# Patient Record
Sex: MALE | Race: WHITE | Employment: FULL TIME | URBAN - METROPOLITAN AREA
[De-identification: names, ages, dates, MRNs, and addresses within clinical notes are randomized per-mention and may not be internally consistent; named-entity substitution may affect disease eponyms.]

---

## 2018-07-05 ENCOUNTER — HOSPITAL ENCOUNTER (EMERGENCY)
Age: 23
Discharge: HOME | End: 2018-07-05
Attending: EMERGENCY MEDICINE
Payer: COMMERCIAL

## 2018-07-05 VITALS
OXYGEN SATURATION: 97 % | TEMPERATURE: 98.1 F | SYSTOLIC BLOOD PRESSURE: 126 MMHG | HEART RATE: 77 BPM | RESPIRATION RATE: 17 BRPM | HEIGHT: 71 IN | BODY MASS INDEX: 23.8 KG/M2 | DIASTOLIC BLOOD PRESSURE: 70 MMHG | WEIGHT: 170 LBS

## 2018-07-05 DIAGNOSIS — H61.23 BILATERAL IMPACTED CERUMEN: ICD-10-CM

## 2018-07-05 DIAGNOSIS — H60.393 INFECTIVE OTITIS EXTERNA OF BOTH EARS: Primary | ICD-10-CM

## 2018-07-05 PROCEDURE — 99282 EMERGENCY DEPT VISIT SF MDM: CPT

## 2018-07-05 PROCEDURE — 6370000000 HC RX 637 (ALT 250 FOR IP): Performed by: EMERGENCY MEDICINE

## 2018-07-05 RX ORDER — NEOMYCIN SULFATE, POLYMYXIN B SULFATE AND HYDROCORTISONE 10; 3.5; 1 MG/ML; MG/ML; [USP'U]/ML
4 SUSPENSION/ DROPS AURICULAR (OTIC) ONCE
Status: COMPLETED | OUTPATIENT
Start: 2018-07-05 | End: 2018-07-05

## 2018-07-05 RX ADMIN — NEOMYCIN SULFATE, POLYMYXIN B SULFATE AND HYDROCORTISONE 4 DROP: 10; 3.5; 1 SUSPENSION/ DROPS AURICULAR (OTIC) at 22:38

## 2018-07-05 ASSESSMENT — ENCOUNTER SYMPTOMS
EYE PAIN: 0
COUGH: 0
DIARRHEA: 0
PHOTOPHOBIA: 0
RHINORRHEA: 0
ABDOMINAL DISTENTION: 0
SHORTNESS OF BREATH: 0
ABDOMINAL PAIN: 0
SINUS PRESSURE: 0
VOMITING: 0
APNEA: 0
SORE THROAT: 0
WHEEZING: 0
CONSTIPATION: 0
COLOR CHANGE: 0
NAUSEA: 0
BACK PAIN: 0

## 2018-07-05 ASSESSMENT — PAIN DESCRIPTION - LOCATION: LOCATION: EAR

## 2018-07-05 ASSESSMENT — PAIN SCALES - GENERAL: PAINLEVEL_OUTOF10: 1

## 2018-07-05 ASSESSMENT — PAIN DESCRIPTION - DESCRIPTORS: DESCRIPTORS: DISCOMFORT

## 2018-07-05 ASSESSMENT — PAIN DESCRIPTION - PAIN TYPE: TYPE: ACUTE PAIN

## 2018-07-06 NOTE — ED PROVIDER NOTES
available at the time of this note:    No orders to display         ED BEDSIDE ULTRASOUND:   Performed by ED Physician - none    LABS:  Labs Reviewed - No data to display    All other labs were within normal range or not returned as of this dictation. EMERGENCY DEPARTMENT COURSE and DIFFERENTIAL DIAGNOSIS/MDM:   Vitals:    Vitals:    07/05/18 2203   BP: 126/70   Pulse: 77   Resp: 17   Temp: 98.1 °F (36.7 °C)   TempSrc: Oral   SpO2: 97%   Weight: 170 lb (77.1 kg)   Height: 5' 11\" (1.803 m)           MDM  Number of Diagnoses or Management Options  Risk of Complications, Morbidity, and/or Mortality  Presenting problems: moderate  Diagnostic procedures: minimal  Management options: moderate    Patient Progress  Patient progress: improved      CRITICAL CARE TIME   Total Critical Care time was  minutes, excluding separately reportable procedures. There was a high probability of clinically significant/life threatening deterioration in the patient's condition which required my urgent intervention. CONSULTS:  None    PROCEDURES:  Unless otherwise noted below, none     Procedures    FINAL IMPRESSION      1. Infective otitis externa of both ears    2.  Bilateral impacted cerumen          DISPOSITION/PLAN   DISPOSITION Decision To Discharge 07/05/2018 10:31:29 PM      PATIENT REFERRED TO:  Everette Nelson MD  [address] [place] [address],[address] [place] [address] Road  [phone]    In 1 week        DISCHARGE MEDICATIONS:  Discharge Medication List as of 7/5/2018 10:32 PM             (Please note that portions of this note were completed with a voice recognition program.  Efforts were made to edit the dictations but occasionally words are mis-transcribed.)    Jennifer Plummer MD (electronically signed)  Attending Emergency Physician          Jennifer Plummer MD  07/06/18 6091 [facility] Barbara Peraza MD  07/12/18 1639

## 2020-06-17 ENCOUNTER — HOSPITAL ENCOUNTER (OUTPATIENT)
Age: 25
Setting detail: SPECIMEN
Discharge: HOME | End: 2020-06-17
Payer: COMMERCIAL

## 2020-06-17 ENCOUNTER — OFFICE VISIT (OUTPATIENT)
Dept: PRIMARY CARE CLINIC | Age: 25
End: 2020-06-17
Payer: COMMERCIAL

## 2020-06-17 VITALS
SYSTOLIC BLOOD PRESSURE: 125 MMHG | TEMPERATURE: 98.2 F | HEART RATE: 94 BPM | DIASTOLIC BLOOD PRESSURE: 71 MMHG | OXYGEN SATURATION: 96 % | RESPIRATION RATE: 16 BRPM

## 2020-06-17 PROBLEM — M25.561 PAIN IN RIGHT KNEE: Status: ACTIVE | Noted: 2018-06-06

## 2020-06-17 PROBLEM — M54.50 LOW BACK PAIN: Status: ACTIVE | Noted: 2019-02-24

## 2020-06-17 PROBLEM — M23.261 OLD BUCKET HANDLE TEAR OF LATERAL MENISCUS OF RIGHT KNEE: Status: ACTIVE | Noted: 2018-08-08

## 2020-06-17 PROCEDURE — U0003 INFECTIOUS AGENT DETECTION BY NUCLEIC ACID (DNA OR RNA); SEVERE ACUTE RESPIRATORY SYNDROME CORONAVIRUS 2 (SARS-COV-2) (CORONAVIRUS DISEASE [COVID-19]), AMPLIFIED PROBE TECHNIQUE, MAKING USE OF HIGH THROUGHPUT TECHNOLOGIES AS DESCRIBED BY CMS-2020-01-R: HCPCS

## 2020-06-17 PROCEDURE — 99202 OFFICE O/P NEW SF 15 MIN: CPT | Performed by: NURSE PRACTITIONER

## 2020-06-17 ASSESSMENT — ENCOUNTER SYMPTOMS
NAUSEA: 0
COUGH: 0
SHORTNESS OF BREATH: 0
DIARRHEA: 0
VOMITING: 0
WHEEZING: 0
ABDOMINAL PAIN: 0
CHEST TIGHTNESS: 0

## 2020-06-17 NOTE — PROGRESS NOTES
[address]  [address] [address]  Dept: [phone]    SUBJECTIVE:   Raghav Melgar is a 30 y.o. male who presents on 6/17/2020 with:    HPI:  Chief Complaint   Patient presents with    Covid Testing     COVID-19 Testing:   Azul Ruiz was self-referred to [facility] for COVID-19 testing required by employer before he can return to work. States he recently finished14-day quarantine due to his girlfriend and 2 y.o. son (same household as pt) were confirmed positive for COVID-19 on 6/2/20. Pt reports he has been in his usual state lately. Denies any signs/ symptoms of illness now or during 2 week quarantine. Evaluation/Treatment to date: none    Pt denies: fever, chills, myalgias, fatigue, cough, dyspnea, wheezing, chest tightness, HA, rhinorrhea/ congestion, sore throat, ear symptoms, skin changes/ rash. Denies anosmia/ dysgeusia. Relevant PMH: no pertinent PMH. Denies contributory past family hx. Smoking history: pt is a current every day smoker, about 1.00 pack per day. he has never used smokeless tobacco.     Travel screen completed: yes- no recent travel. Review of Systems   Constitutional: Negative for chills and fever. HENT: Negative for congestion, ear pain, rhinorrhea and sore throat. Respiratory: Negative for cough, chest tightness, shortness of breath and wheezing. Cardiovascular: Negative for chest pain and palpitations. Gastrointestinal: Negative for abdominal pain, diarrhea, nausea and vomiting. Musculoskeletal: Negative for myalgias. Skin: Negative for rash. Allergic/Immunologic: Negative for immunocompromised state. Neurological: Negative for headaches. OBJECTIVE:     VITALS:  Vitals:    06/17/20 1325   BP: 125/71   Pulse: 94   Resp: 16   Temp: 98.2 °F (36.8 °C)   TempSrc: Oral   SpO2: 96%  Comment: on room air      Physical Exam  Vitals signs reviewed. Constitutional:       Appearance: He is well-groomed.  He is not specimen was collected via oropharyngeal route and sent to lab. ASSESSMENT & PLAN:   Otherwise healthy, asymptomatic 30 y.o. male w/ exposure to COVID-19 via 2 household members presenting for COVID-19 test in order to return to work. Pt is well-appearing, afebrile, asymptomatic today in office. Oropharyngeal swab for SARS-CoV-2 testing was obtained. Pt was instructed on strict self-isolation at home pending results w/ self-monitoring for development of symptoms. Diagnoses and all orders for this visit:     Diagnosis Orders   1. Close exposure to COVID-19 virus  COVID-19 Ambulatory     Pt was sent home w/ written instructions for:  Possible COVID-19 infection w/ self-quarantine at home and self-monitoring for symptoms  Do not leave home except to get medical care  Frequent hand washing, cover coughs/ sneezes, clean + disinfect frequently touched surfaces daily  OTC NSAIDs/ acetaminophen prn fever/ myalgias  Maintain adequate rest and hydration  Discussed and encouraged smoking cessation  Discussed temp. checks BID and vigilant self-monitoring for any signs/ sx's of illness w/ PCP or Flu Clinic follow-up (call first) if any develop    ED/ 9-1-1 immediately w/ high or refractory fever, SOB, CP, shaking chills, vomiting, abdominal pain, etc.    Pt is in agreement w/ this care plan. All questions answered. Return for follow-up with [facility] to establish with a PCP. ANDREW Nova - CNP  6/17/20     This visit was provided as a focused evaluation during the COVID -19 pandemic/national emergency. A comprehensive review of all previous patient history and testing was not conducted. Pertinent findings were elicited during the visit.

## 2020-06-17 NOTE — PATIENT INSTRUCTIONS
Put on a facemask before you enter the facility. These steps will help the healthcare providers office to keep other people in the office or waiting room from getting infected or exposed. Ask your healthcare provider to call the local or state health department. Persons who are placed under active monitoring or facilitated self-monitoring should follow instructions provided by their local health department or occupational health professionals, as appropriate. When working with your local health department check their available hours. If you have a medical emergency and need to call 911, notify the dispatch personnel that you have, or are being evaluated for COVID-19. If possible, put on a facemask before emergency medical services arrive. Discontinuing home isolation  Patients with confirmed COVID-19 should remain under home isolation precautions until the risk of secondary transmission to others is thought to be low. The decision to discontinue home isolation precautions should be made on a case-by-case basis, in consultation with healthcare providers and state and local health departments.

## 2020-06-20 LAB
SARS-COV-2: NOT DETECTED
SOURCE: NORMAL

## 2020-06-22 ASSESSMENT — ENCOUNTER SYMPTOMS
SORE THROAT: 0
RHINORRHEA: 0

## 2021-06-10 ENCOUNTER — OFFICE VISIT (OUTPATIENT)
Dept: PRIMARY CARE CLINIC | Age: 26
End: 2021-06-10
Payer: COMMERCIAL

## 2021-06-10 VITALS
BODY MASS INDEX: 24.92 KG/M2 | TEMPERATURE: 98.1 F | WEIGHT: 178 LBS | SYSTOLIC BLOOD PRESSURE: 112 MMHG | HEIGHT: 71 IN | DIASTOLIC BLOOD PRESSURE: 70 MMHG

## 2021-06-10 DIAGNOSIS — R07.81 PLEURITIC CHEST PAIN: ICD-10-CM

## 2021-06-10 DIAGNOSIS — Z76.89 ENCOUNTER TO ESTABLISH CARE: ICD-10-CM

## 2021-06-10 DIAGNOSIS — Z00.00 PREVENTATIVE HEALTH CARE: ICD-10-CM

## 2021-06-10 DIAGNOSIS — R07.89 MUSCULOSKELETAL CHEST PAIN: Primary | ICD-10-CM

## 2021-06-10 LAB
ALBUMIN SERPL-MCNC: 4.6 G/DL (ref 3.5–4.6)
ALP BLD-CCNC: 71 U/L (ref 35–104)
ALT SERPL-CCNC: 16 U/L (ref 0–41)
ANION GAP SERPL CALCULATED.3IONS-SCNC: 13 MEQ/L (ref 9–15)
AST SERPL-CCNC: 23 U/L (ref 0–40)
BASOPHILS ABSOLUTE: 0 K/UL (ref 0–0.2)
BASOPHILS RELATIVE PERCENT: 0.6 %
BILIRUB SERPL-MCNC: 0.6 MG/DL (ref 0.2–0.7)
BUN BLDV-MCNC: 17 MG/DL (ref 6–20)
CALCIUM SERPL-MCNC: 9.8 MG/DL (ref 8.5–9.9)
CHLORIDE BLD-SCNC: 105 MEQ/L (ref 95–107)
CO2: 23 MEQ/L (ref 20–31)
CREAT SERPL-MCNC: 0.67 MG/DL (ref 0.7–1.2)
D DIMER: 0.3 MG/L FEU (ref 0–0.5)
EOSINOPHILS ABSOLUTE: 0.1 K/UL (ref 0–0.7)
EOSINOPHILS RELATIVE PERCENT: 1.5 %
GFR AFRICAN AMERICAN: >60
GFR NON-AFRICAN AMERICAN: >60
GLOBULIN: 2.8 G/DL (ref 2.3–3.5)
GLUCOSE BLD-MCNC: 86 MG/DL (ref 70–99)
HCT VFR BLD CALC: 43.2 % (ref 42–52)
HEMOGLOBIN: 14.7 G/DL (ref 14–18)
LYMPHOCYTES ABSOLUTE: 1.9 K/UL (ref 1–4.8)
LYMPHOCYTES RELATIVE PERCENT: 33 %
MCH RBC QN AUTO: 31.3 PG (ref 27–31.3)
MCHC RBC AUTO-ENTMCNC: 34 % (ref 33–37)
MCV RBC AUTO: 92 FL (ref 80–100)
MONOCYTES ABSOLUTE: 0.3 K/UL (ref 0.2–0.8)
MONOCYTES RELATIVE PERCENT: 4.9 %
NEUTROPHILS ABSOLUTE: 3.5 K/UL (ref 1.4–6.5)
NEUTROPHILS RELATIVE PERCENT: 60 %
PDW BLD-RTO: 13.3 % (ref 11.5–14.5)
PLATELET # BLD: 193 K/UL (ref 130–400)
POTASSIUM SERPL-SCNC: 4.4 MEQ/L (ref 3.4–4.9)
RBC # BLD: 4.69 M/UL (ref 4.7–6.1)
SODIUM BLD-SCNC: 141 MEQ/L (ref 135–144)
TOTAL PROTEIN: 7.4 G/DL (ref 6.3–8)
TSH REFLEX: 0.42 UIU/ML (ref 0.44–3.86)
WBC # BLD: 5.9 K/UL (ref 4.8–10.8)

## 2021-06-10 PROCEDURE — 99204 OFFICE O/P NEW MOD 45 MIN: CPT | Performed by: INTERNAL MEDICINE

## 2021-06-10 RX ORDER — IBUPROFEN 600 MG/1
600 TABLET ORAL 4 TIMES DAILY PRN
Qty: 60 TABLET | Refills: 1 | Status: SHIPPED | OUTPATIENT
Start: 2021-06-10 | End: 2021-06-10

## 2021-06-10 RX ORDER — KETOROLAC TROMETHAMINE 10 MG/1
10 TABLET, FILM COATED ORAL EVERY 6 HOURS PRN
Qty: 20 TABLET | Refills: 0 | Status: SHIPPED | OUTPATIENT
Start: 2021-06-10 | End: 2022-06-10

## 2021-06-10 SDOH — ECONOMIC STABILITY: TRANSPORTATION INSECURITY
IN THE PAST 12 MONTHS, HAS LACK OF TRANSPORTATION KEPT YOU FROM MEETINGS, WORK, OR FROM GETTING THINGS NEEDED FOR DAILY LIVING?: NO

## 2021-06-10 SDOH — ECONOMIC STABILITY: FOOD INSECURITY: WITHIN THE PAST 12 MONTHS, THE FOOD YOU BOUGHT JUST DIDN'T LAST AND YOU DIDN'T HAVE MONEY TO GET MORE.: NEVER TRUE

## 2021-06-10 SDOH — ECONOMIC STABILITY: TRANSPORTATION INSECURITY
IN THE PAST 12 MONTHS, HAS THE LACK OF TRANSPORTATION KEPT YOU FROM MEDICAL APPOINTMENTS OR FROM GETTING MEDICATIONS?: NO

## 2021-06-10 SDOH — ECONOMIC STABILITY: FOOD INSECURITY: WITHIN THE PAST 12 MONTHS, YOU WORRIED THAT YOUR FOOD WOULD RUN OUT BEFORE YOU GOT MONEY TO BUY MORE.: NEVER TRUE

## 2021-06-10 ASSESSMENT — PATIENT HEALTH QUESTIONNAIRE - PHQ9
1. LITTLE INTEREST OR PLEASURE IN DOING THINGS: 0
SUM OF ALL RESPONSES TO PHQ QUESTIONS 1-9: 0
SUM OF ALL RESPONSES TO PHQ9 QUESTIONS 1 & 2: 0
2. FEELING DOWN, DEPRESSED OR HOPELESS: 0
SUM OF ALL RESPONSES TO PHQ QUESTIONS 1-9: 0
SUM OF ALL RESPONSES TO PHQ QUESTIONS 1-9: 0

## 2021-06-10 ASSESSMENT — ENCOUNTER SYMPTOMS
DIARRHEA: 0
SHORTNESS OF BREATH: 1
NAUSEA: 0
ABDOMINAL PAIN: 0
WHEEZING: 0
COUGH: 0
VOMITING: 0

## 2021-06-10 ASSESSMENT — SOCIAL DETERMINANTS OF HEALTH (SDOH): HOW HARD IS IT FOR YOU TO PAY FOR THE VERY BASICS LIKE FOOD, HOUSING, MEDICAL CARE, AND HEATING?: NOT HARD AT ALL

## 2021-06-10 NOTE — PROGRESS NOTES
Subjective:      Patient ID: Chivo Botello is a 30 y.o. male    New patient   HPI  Patient presents to establish care with new PCP. PMHx: none   Family hx colon cancer: none   Last tetanus shot: declines today             CC: centralized chest pain x 6 months   Pain is sharp and dull pain rated 5-7/10, nonradiating, worse with physical exertion and arm raising. CP is related to breathing and coughing, though no cough. No help from OTC meds. Assoc SOB, no wheezing or palpitations, no fever or chills. No antecedent trauma. Hx b/l clavicular fracture at least 10 yrs ago. Claims he was seen in [address] [place] [address] last month but no records found in Care Everywhere. History reviewed. No pertinent past medical history.   Past Surgical History:   Procedure Laterality Date    ANKLE SURGERY      left    KNEE CARTILAGE SURGERY Right 2018     Social History     Socioeconomic History    Marital status: Single     Spouse name: Not on file    Number of children: Not on file    Years of education: Not on file    Highest education level: Not on file   Occupational History    Not on file   Tobacco Use    Smoking status: Former Smoker     Packs/day: 1.00     Years: 10.00     Pack years: 10.00     Types: Cigarettes     Quit date: 6/10/2020     Years since quittin.0    Smokeless tobacco: Never Used   Vaping Use    Vaping Use: Some days    Substances: Nicotine   Substance and Sexual Activity    Alcohol use: No    Drug use: Yes     Types: Marijuana    Sexual activity: Yes     Partners: Female   Other Topics Concern    Not on file   Social History Narrative    Not on file     Social Determinants of Health     Financial Resource Strain: Low Risk     Difficulty of Paying Living Expenses: Not hard at all   Food Insecurity: No Food Insecurity    Worried About Running Out of Food in the Last Year: Never true    Young of Food in the Last Year: Never true   Transportation Needs: No Transportation Needs    Lack of not diaphoretic. Cardiovascular:      Rate and Rhythm: Normal rate and regular rhythm. Pulses: Normal pulses. Heart sounds: Normal heart sounds, S1 normal and S2 normal. No murmur heard. Pulmonary:      Effort: Pulmonary effort is normal. No respiratory distress. Breath sounds: Normal breath sounds. No wheezing or rales. Chest:      Chest wall: Tenderness (marked chest wall TTP, no rash ) present. Abdominal:      General: Bowel sounds are normal.      Tenderness: There is no abdominal tenderness. Neurological:      Mental Status: He is alert. Assessment:       Diagnosis Orders   1. Musculoskeletal chest pain  XR CHEST STANDARD (2 VW)    [facility]    DISCONTINUED: ibuprofen (ADVIL;MOTRIN) 600 MG tablet   2. Pleuritic chest pain  D-Dimer, Quantitative    less likely PE   3. Preventative health care  CBC With Auto Differential    Comprehensive Metabolic Panel    TSH with Reflex    Hepatitis C Antibody    HIV Screen   4.  Encounter to establish care       Plan:      Orders Placed This Encounter   Procedures    XR CHEST STANDARD (2 VW)     Standing Status:   Future     Standing Expiration Date:   6/10/2022     Order Specific Question:   Reason for exam:     Answer:   ?? missing rib (per pt)    CBC With Auto Differential     Standing Status:   Future     Number of Occurrences:   1     Standing Expiration Date:   6/10/2022    Comprehensive Metabolic Panel     Standing Status:   Future     Number of Occurrences:   1     Standing Expiration Date:   6/10/2022    TSH with Reflex     Standing Status:   Future     Number of Occurrences:   1     Standing Expiration Date:   6/10/2022    Hepatitis C Antibody     Standing Status:   Future     Number of Occurrences:   1     Standing Expiration Date:   6/10/2022    HIV Screen     Standing Status:   Future     Number of Occurrences:   1     Standing Expiration Date:   6/10/2022    D-Dimer, Quantitative Standing Status:   Future     Number of Occurrences:   1     Standing Expiration Date:   6/10/2022   [address] and [facility], [place]     Referral Priority:   Routine     Referral Type:   Eval and Treat     Referral Reason:   Specialty Services Required     Requested Specialty:   Orthopedic Surgery     Number of Visits Requested:   1     Orders Placed This Encounter   Medications    DISCONTD: ibuprofen (ADVIL;MOTRIN) 600 MG tablet     Sig: Take 1 tablet by mouth 4 times daily as needed for Pain     Dispense:  60 tablet     Refill:  1    ketorolac (TORADOL) 10 MG tablet     Sig: Take 1 tablet by mouth every 6 hours as needed for Pain     Dispense:  20 tablet     Refill:  0     Dc ibuprofen 600     Return in about 1 month (around 7/10/2021) for follow up.

## 2021-06-11 DIAGNOSIS — R79.89 LOW TSH LEVEL: Primary | ICD-10-CM

## 2021-06-11 LAB — HEPATITIS C ANTIBODY INTERPRETATION: NORMAL

## 2021-06-12 LAB — HIV AG/AB: NONREACTIVE

## 2021-06-14 ENCOUNTER — TELEPHONE (OUTPATIENT)
Dept: PRIMARY CARE CLINIC | Age: 26
End: 2021-06-14

## 2021-06-14 NOTE — TELEPHONE ENCOUNTER
The ketorolac you gave the patient has not been helping, he is still experiencing the pain. Please advise.

## 2021-06-15 ENCOUNTER — TELEPHONE (OUTPATIENT)
Dept: PRIMARY CARE CLINIC | Age: 26
End: 2021-06-15

## 2021-06-15 DIAGNOSIS — R07.89 MUSCULOSKELETAL CHEST PAIN: Primary | ICD-10-CM

## 2021-06-15 DIAGNOSIS — R79.89 LOW TSH LEVEL: ICD-10-CM

## 2021-06-15 LAB
T3 FREE: 3.1 PG/ML (ref 2–4.4)
T4 FREE: 0.98 NG/DL (ref 0.84–1.68)
TSH REFLEX: 1.2 UIU/ML (ref 0.44–3.86)

## 2021-06-15 RX ORDER — IBUPROFEN 800 MG/1
800 TABLET ORAL 2 TIMES DAILY PRN
Qty: 60 TABLET | Refills: 0 | Status: SHIPPED | OUTPATIENT
Start: 2021-06-15

## 2021-06-16 NOTE — TELEPHONE ENCOUNTER
Pt came in late on 6/15 and had labs and xray done, he is advised of taking the ibuprofen on a full stomach. He just asked if he could be notified sooner rather than later when all the labs and xray come back.

## 2021-06-19 DIAGNOSIS — R07.89 MUSCULOSKELETAL CHEST PAIN: Primary | ICD-10-CM

## 2021-06-19 RX ORDER — LIDOCAINE 4 G/G
1 PATCH TOPICAL DAILY
Qty: 1 BOX | Refills: 3 | Status: SHIPPED | OUTPATIENT
Start: 2021-06-19

## 2021-06-21 DIAGNOSIS — R07.89 MUSCULOSKELETAL CHEST PAIN: Primary | ICD-10-CM

## 2021-06-23 ENCOUNTER — TELEPHONE (OUTPATIENT)
Dept: PRIMARY CARE CLINIC | Age: 26
End: 2021-06-23

## 2021-06-23 DIAGNOSIS — R07.89 MUSCULOSKELETAL CHEST PAIN: Primary | ICD-10-CM

## 2021-06-23 RX ORDER — HYDROCODONE BITARTRATE AND ACETAMINOPHEN 5; 325 MG/1; MG/1
1 TABLET ORAL EVERY 8 HOURS PRN
Qty: 10 TABLET | Refills: 0 | Status: SHIPPED | OUTPATIENT
Start: 2021-06-23 | End: 2021-06-30

## 2021-06-23 NOTE — TELEPHONE ENCOUNTER
Patient calling in, states that you sent over scripts for him and not helping  Still having pain from center of chest up into his left shoulder  Asking if you can call him back

## 2021-06-23 NOTE — TELEPHONE ENCOUNTER
Aryan Even call Dr. Mulu Ott office to hep pt set appt   Also help him make appt for CT chest     Thank you

## 2021-06-23 NOTE — TELEPHONE ENCOUNTER
I called and spoke with Kourtney Valera MD, Cardiothoracic Surgery Office and they told me they will reach out to the patient today and get his Ct and all appointment needed scheduled today.

## 2021-07-02 ENCOUNTER — HOSPITAL ENCOUNTER (OUTPATIENT)
Dept: CT IMAGING | Age: 26
Discharge: HOME | End: 2021-07-04
Payer: COMMERCIAL

## 2021-07-02 ENCOUNTER — TELEPHONE (OUTPATIENT)
Dept: PRIMARY CARE CLINIC | Age: 26
End: 2021-07-02

## 2021-07-02 DIAGNOSIS — R07.89 MUSCULOSKELETAL CHEST PAIN: ICD-10-CM

## 2021-07-02 DIAGNOSIS — R07.89 MUSCULOSKELETAL CHEST PAIN: Primary | ICD-10-CM

## 2021-07-02 PROCEDURE — 71250 CT THORAX DX C-: CPT

## 2021-07-02 RX ORDER — CYCLOBENZAPRINE HCL 10 MG
10 TABLET ORAL NIGHTLY PRN
Qty: 30 TABLET | Refills: 0 | Status: SHIPPED | OUTPATIENT
Start: 2021-07-02 | End: 2021-07-12

## 2021-07-02 NOTE — TELEPHONE ENCOUNTER
He had his CT of the chest done this morning and he is still having chest pain going in to his shoulder. His # is [phone].

## 2021-07-03 ENCOUNTER — TELEPHONE (OUTPATIENT)
Dept: FAMILY MEDICINE CLINIC | Age: 26
End: 2021-07-03

## 2021-07-03 RX ORDER — PREDNISONE 10 MG/1
TABLET ORAL
Qty: 21 TABLET | Refills: 0 | Status: SHIPPED | OUTPATIENT
Start: 2021-07-03

## 2021-07-03 RX ORDER — TIZANIDINE 2 MG/1
2 TABLET ORAL EVERY 8 HOURS PRN
Qty: 30 TABLET | Refills: 0 | Status: SHIPPED | OUTPATIENT
Start: 2021-07-03

## 2021-07-03 NOTE — TELEPHONE ENCOUNTER
Spoke with patient  Agreeable to prednisone and muscle relaxer  Watch for sedation  Please go to walk in clinic or urgent care if not better asap  He mentions voltaren and flexeril not helping  Try diff muscle relaxer and try prednisone  He failed lidocaine patch as well.

## 2021-07-06 ENCOUNTER — TELEPHONE (OUTPATIENT)
Dept: PRIMARY CARE CLINIC | Age: 26
End: 2021-07-06

## 2021-07-06 DIAGNOSIS — R07.89 MUSCULOSKELETAL CHEST PAIN: Primary | ICD-10-CM

## 2021-07-06 NOTE — TELEPHONE ENCOUNTER
Pt aware and given the number to the pain management dept. Seemed upset said the provider on call said if what he didn't didn't help he could just call back to provider and they would change. I did tell him it was up to the provider, and she wants him to seek treatment with pain management.

## 2021-07-06 NOTE — TELEPHONE ENCOUNTER
Pt was switched to Zanalfex and prednisone by Dr. Saniya Jaimes, but says that is still not helping symptoms (shoulder pain and chest tightness) and it is making him very drowsy and and nauseous.

## 2021-07-07 NOTE — TELEPHONE ENCOUNTER
Barry Lorenz, per yesterday's discussion pls get pain mx to see him today. I understand you could not get them on the phone yesterday.      Thanks

## 2021-07-09 ENCOUNTER — OFFICE VISIT (OUTPATIENT)
Dept: CARDIOTHORACIC SURGERY | Age: 26
End: 2021-07-09

## 2021-07-09 VITALS — HEART RATE: 73 BPM | WEIGHT: 178 LBS | OXYGEN SATURATION: 97 % | BODY MASS INDEX: 24.92 KG/M2 | HEIGHT: 71 IN

## 2021-07-09 DIAGNOSIS — I71.21 ASCENDING AORTIC ANEURYSM: Primary | ICD-10-CM

## 2021-07-09 DIAGNOSIS — R07.89 STERNUM PAIN: Primary | ICD-10-CM

## 2021-07-09 PROCEDURE — 99203 OFFICE O/P NEW LOW 30 MIN: CPT | Performed by: THORACIC SURGERY (CARDIOTHORACIC VASCULAR SURGERY)

## 2021-07-09 ASSESSMENT — ENCOUNTER SYMPTOMS
CHEST TIGHTNESS: 0
TROUBLE SWALLOWING: 0
STRIDOR: 0
WHEEZING: 0
VOMITING: 0
NAUSEA: 0
COUGH: 0
SHORTNESS OF BREATH: 0
ABDOMINAL DISTENTION: 0
ABDOMINAL PAIN: 0
VOICE CHANGE: 0
CHOKING: 0
DIARRHEA: 0
SORE THROAT: 0

## 2021-07-09 NOTE — PATIENT INSTRUCTIONS
Recommend continuing to take anti-inflammatories and avoid activities that make his costal cartilages irritated.

## 2021-07-19 ENCOUNTER — INITIAL CONSULT (OUTPATIENT)
Dept: PAIN MANAGEMENT | Age: 26
End: 2021-07-19
Payer: COMMERCIAL

## 2021-07-19 VITALS
HEIGHT: 71 IN | SYSTOLIC BLOOD PRESSURE: 130 MMHG | TEMPERATURE: 97.9 F | WEIGHT: 170 LBS | DIASTOLIC BLOOD PRESSURE: 79 MMHG | BODY MASS INDEX: 23.8 KG/M2

## 2021-07-19 DIAGNOSIS — M25.512 LEFT SHOULDER PAIN, UNSPECIFIED CHRONICITY: ICD-10-CM

## 2021-07-19 DIAGNOSIS — R07.9 CHEST PAIN, UNSPECIFIED TYPE: Primary | ICD-10-CM

## 2021-07-19 PROCEDURE — 99204 OFFICE O/P NEW MOD 45 MIN: CPT | Performed by: PAIN MEDICINE

## 2021-07-19 ASSESSMENT — ENCOUNTER SYMPTOMS
BACK PAIN: 0
SHORTNESS OF BREATH: 0
DIARRHEA: 0
CONSTIPATION: 0
NAUSEA: 0

## 2021-07-19 NOTE — PROGRESS NOTES
[facility]) Physicians  Neurosurgery and Pain [address], [address] [facility], [place]: [phone]  F: [phone]        Thu Rubi  (1995)    7/19/2021    Subjective:     Thu Rubi is 30 y.o. male who complains today of:     Chief Complaint   Patient presents with    Other     chest    Shoulder Pain     left     Oksana Wolfe is here today for initial evaluation. He did see Dr. Glenny Hein from cardiothoracic surgery on 7/9/2020. Patient has chest pain that travels to the left shoulder going on for about 8 months. No back surgery or neck surgery. Is not diabetic, is not on blood thinners, he works as a press brake lead. Laying is his best position, sitting, bending,  raising his arms up can bother him. He does not smoke. It is intermittent. Sometimes it pops the pain will go into the left shoulder. It is sharp and sore to the touch. He had a CT scan. No significant recent trauma. He has not had any injections. He has tried topical antibiotics analgesics as well as anti-inflammatories and steroids. He has tried Tylenol. Nothing helps, sometimes it can be quite severe and almost make him lightheaded. He denies any numbness. Plan: We discussed options in detail. He does not describe classic costochondritis at this time although we cannot rule out an element of it but he also has pain that refers to the left shoulder some mild pain with range of motion left shoulder. We will have him see Dr. Alis Lorenzo to see if he thinks injections are worthwhile in the sternum and if patient needs any further L shoulder work-up. Patient has failed conservative treatment. Questions answered, chart was reviewed, he is in agreement. Allergies:  Patient has no known allergies. History reviewed. No pertinent past medical history.   Past Surgical History:   Procedure Laterality Date    ANKLE SURGERY      left    KNEE CARTILAGE SURGERY Right 2018 Family History   Problem Relation Age of Onset    Atrial Fibrillation Maternal Grandmother      Social History     Socioeconomic History    Marital status: Single     Spouse name: Not on file    Number of children: Not on file    Years of education: Not on file    Highest education level: Not on file   Occupational History    Not on file   Tobacco Use    Smoking status: Former Smoker     Packs/day: 1.00     Years: 10.00     Pack years: 10.00     Types: Cigarettes     Quit date: 6/10/2020     Years since quittin.1    Smokeless tobacco: Never Used   Vaping Use    Vaping Use: Some days    Substances: Nicotine   Substance and Sexual Activity    Alcohol use: No    Drug use: Yes     Types: Marijuana    Sexual activity: Yes     Partners: Female   Other Topics Concern    Not on file   Social History Narrative    Not on file     Social Determinants of Health     Financial Resource Strain: Low Risk     Difficulty of Paying Living Expenses: Not hard at all   Food Insecurity: No Food Insecurity    Worried About Running Out of Food in the Last Year: Never true    Young of Food in the Last Year: Never true   Transportation Needs: No Transportation Needs    Lack of Transportation (Medical): No    Lack of Transportation (Non-Medical):  No   Physical Activity:     Days of Exercise per Week:     Minutes of Exercise per Session:    Stress:     Feeling of Stress :    Social Connections:     Frequency of Communication with Friends and Family:     Frequency of Social Gatherings with Friends and Family:     Attends Sikh Services:     Active Member of Clubs or Organizations:     Attends Club or Organization Meetings:     Marital Status:    Intimate Partner Violence:     Fear of Current or Ex-Partner:     Emotionally Abused:     Physically Abused:     Sexually Abused:        Current Outpatient Medications on File Prior to Visit   Medication Sig Dispense Refill    tiZANidine (Emperatriz Memos) 2 MG tablet Take 1 tablet by mouth every 8 hours as needed (shoulder pain) Hold for sedation, no driving, operating machinery. 30 tablet 0    predniSONE (DELTASONE) 10 MG tablet Take 4 tabs po qd x 2 days, then 3 tabs po qd x 2 days, then 2 tabs po qd x 2 days, then 1 tab po qd x 2 days, then 1/2 tab po qd x 2 days. 21 tablet 0    diclofenac (VOLTAREN) 50 MG EC tablet Take 1 tablet by mouth 3 times daily as needed for Pain 60 tablet 3    lidocaine 4 % external patch Place 1 patch onto the skin daily Apply to area of chest pain 1 box 3    diclofenac sodium (VOLTAREN) 1 % GEL Apply topically 2 times daily Apply to area of chest 50 g 2    ibuprofen (ADVIL;MOTRIN) 800 MG tablet Take 1 tablet by mouth 2 times daily as needed for Pain 60 tablet 0    ketorolac (TORADOL) 10 MG tablet Take 1 tablet by mouth every 6 hours as needed for Pain 20 tablet 0     No current facility-administered medications on file prior to visit. HPI    Review of Systems   Constitutional: Negative for fever. HENT: Negative for hearing loss. Respiratory: Negative for shortness of breath. Gastrointestinal: Negative for constipation, diarrhea and nausea. Genitourinary: Negative for difficulty urinating. Musculoskeletal: Negative for back pain and neck pain. Skin: Negative for rash. Neurological: Negative for headaches. Hematological: Does not bruise/bleed easily. Psychiatric/Behavioral: Negative for sleep disturbance. Objective:     Vitals:  /79   Temp 97.9 °F (36.6 °C)   Ht 5' 11\" (1.803 m)   Wt 170 lb (77.1 kg)   BMI 23.71 kg/m² Pain Score:   4      Physical Exam  Patient is AO X 3 , recent and remote memory is intact,mood and affect normal,judgement and insight normal. Head normacephalic,eyes - PERRLA, EOMI, No obvious external Ears, Nose, mouth masses seen, neck supple, trachae midline, no abnormal lymph nodes palpated in neck or supraclavicular region. CN's 2-12 grossly intact. Strength functional for ambulation, balance functional, coordination normal. Visualized skin intact, no obvious lesion or visualized cyanosis. No swelling. Pulses intact. No obvious upper motor neuron signs. Sensation grossly intact to light touch. Strength functional upper extremities. Tender left sternal region to touch. Mild  pain with range of motion left shoulder. Assessment:      Diagnosis Orders   1. Chest pain, unspecified type     2.  Left shoulder pain, unspecified chronicity         Plan:

## 2021-07-20 ENCOUNTER — TELEPHONE (OUTPATIENT)
Dept: PAIN MANAGEMENT | Age: 26
End: 2021-07-20

## 2021-07-20 NOTE — TELEPHONE ENCOUNTER
Patient states that he forgot to tell Dr. Lonnie Caballero that he did get a small Rx for #10 norco from Dr. Cheyenne Jones that did start to help with his chest pain. He is wondering if Dr. Lonnie Caballero could prescribe him that while he is waiting to see Dr. Ivon Nevarez?

## 2021-08-09 ENCOUNTER — TELEPHONE (OUTPATIENT)
Dept: PAIN MANAGEMENT | Age: 26
End: 2021-08-09

## 2021-08-09 ENCOUNTER — INITIAL CONSULT (OUTPATIENT)
Dept: SPORTS MEDICINE | Age: 26
End: 2021-08-09
Payer: COMMERCIAL

## 2021-08-09 VITALS
HEIGHT: 71 IN | BODY MASS INDEX: 24.5 KG/M2 | SYSTOLIC BLOOD PRESSURE: 104 MMHG | DIASTOLIC BLOOD PRESSURE: 66 MMHG | WEIGHT: 175 LBS | TEMPERATURE: 97.3 F

## 2021-08-09 DIAGNOSIS — M19.012: Primary | ICD-10-CM

## 2021-08-09 DIAGNOSIS — S43.60XA: ICD-10-CM

## 2021-08-09 PROCEDURE — 99204 OFFICE O/P NEW MOD 45 MIN: CPT | Performed by: FAMILY MEDICINE

## 2021-08-09 ASSESSMENT — ENCOUNTER SYMPTOMS
APNEA: 0
EYE DISCHARGE: 0
CHEST TIGHTNESS: 0
ABDOMINAL DISTENTION: 0
SINUS PAIN: 0
FACIAL SWELLING: 0

## 2021-08-09 NOTE — TELEPHONE ENCOUNTER
Patient called asking if there was anything he could do temporarily to handle the chest pains. Pt states that in the past lidocaine patches and muscle relaxants have not provided any relief.

## 2021-08-10 ENCOUNTER — TELEPHONE (OUTPATIENT)
Dept: SPORTS MEDICINE | Age: 26
End: 2021-08-10

## 2021-08-10 DIAGNOSIS — M19.012: ICD-10-CM

## 2021-08-24 ENCOUNTER — OFFICE VISIT (OUTPATIENT)
Dept: CARDIOLOGY CLINIC | Age: 26
End: 2021-08-24
Payer: COMMERCIAL

## 2021-08-24 VITALS
BODY MASS INDEX: 25.1 KG/M2 | HEART RATE: 49 BPM | DIASTOLIC BLOOD PRESSURE: 70 MMHG | SYSTOLIC BLOOD PRESSURE: 110 MMHG | WEIGHT: 180 LBS

## 2021-08-24 DIAGNOSIS — Z00.00 PE (PHYSICAL EXAM), ROUTINE: Primary | ICD-10-CM

## 2021-08-24 DIAGNOSIS — R07.2 PRECORDIAL PAIN: ICD-10-CM

## 2021-08-24 PROCEDURE — 93000 ELECTROCARDIOGRAM COMPLETE: CPT | Performed by: INTERNAL MEDICINE

## 2021-08-24 PROCEDURE — 99203 OFFICE O/P NEW LOW 30 MIN: CPT | Performed by: INTERNAL MEDICINE

## 2021-08-24 NOTE — PROGRESS NOTES
Chief Complaint   Patient presents with    Establish Cardiologist      [facility] REFERRING       Patient presents for initial medical evaluation. Patient is followed on a regular basis by Dr. Lorrie Hutton MD. Had CP and underwent CT of chest which showed a normal aorta. Was seen by   Thoracic surgery and per their measurement showed an ascending aorta measuring 3.7cm. Was thought to have costochondritis. Following with sports medicine. C/o midsternal CP, pops with stretching, gets Lh/dizz. Had one episode of hands shaking, SOB. Called EMS. Workup was negative. No hx of DM, HTN or HLD. Quit smoking, vapes. No drugs now, used to use cocaine. EKG with NSR, no ischemia.        Patient Active Problem List   Diagnosis    Old bucket handle tear of lateral meniscus of right knee    Pain in right knee    Low back pain    Precordial pain       Past Surgical History:   Procedure Laterality Date    ANKLE SURGERY      left    KNEE CARTILAGE SURGERY Right 2018       Social History     Socioeconomic History    Marital status: Single     Spouse name: Not on file    Number of children: Not on file    Years of education: Not on file    Highest education level: Not on file   Occupational History    Not on file   Tobacco Use    Smoking status: Former Smoker     Packs/day: 1.00     Years: 10.00     Pack years: 10.00     Types: Cigarettes     Quit date: 6/10/2020     Years since quittin.2    Smokeless tobacco: Never Used   Vaping Use    Vaping Use: Some days    Substances: Nicotine   Substance and Sexual Activity    Alcohol use: No    Drug use: Yes     Types: Marijuana    Sexual activity: Yes     Partners: Female   Other Topics Concern    Not on file   Social History Narrative    Not on file     Social Determinants of Health     Financial Resource Strain: Low Risk     Difficulty of Paying Living Expenses: Not hard at all   Food Insecurity: No Food Insecurity    Worried About [address] in the Last Year: Never true    Ran Out of Food in the Last Year: Never true   Transportation Needs: No Transportation Needs    Lack of Transportation (Medical): No    Lack of Transportation (Non-Medical): No   Physical Activity:     Days of Exercise per Week:     Minutes of Exercise per Session:    Stress:     Feeling of Stress :    Social Connections:     Frequency of Communication with Friends and Family:     Frequency of Social Gatherings with Friends and Family:     Attends Hindu Services:     Active Member of Clubs or Organizations:     Attends Club or Organization Meetings:     Marital Status:    Intimate Partner Violence:     Fear of Current or Ex-Partner:     Emotionally Abused:     Physically Abused:     Sexually Abused:        Family History   Problem Relation Age of Onset    Atrial Fibrillation Maternal Grandmother        Current Outpatient Medications   Medication Sig Dispense Refill    tiZANidine (ZANAFLEX) 2 MG tablet Take 1 tablet by mouth every 8 hours as needed (shoulder pain) Hold for sedation, no driving, operating machinery. 30 tablet 0    predniSONE (DELTASONE) 10 MG tablet Take 4 tabs po qd x 2 days, then 3 tabs po qd x 2 days, then 2 tabs po qd x 2 days, then 1 tab po qd x 2 days, then 1/2 tab po qd x 2 days. 21 tablet 0    diclofenac (VOLTAREN) 50 MG EC tablet Take 1 tablet by mouth 3 times daily as needed for Pain 60 tablet 3    lidocaine 4 % external patch Place 1 patch onto the skin daily Apply to area of chest pain 1 box 3    diclofenac sodium (VOLTAREN) 1 % GEL Apply topically 2 times daily Apply to area of chest 50 g 2    ibuprofen (ADVIL;MOTRIN) 800 MG tablet Take 1 tablet by mouth 2 times daily as needed for Pain 60 tablet 0    ketorolac (TORADOL) 10 MG tablet Take 1 tablet by mouth every 6 hours as needed for Pain 20 tablet 0     No current facility-administered medications for this visit. Patient has no known allergies.     Review of Systems:  General ROS: negative  Psychological ROS: negative  Hematological and Lymphatic ROS: No history of blood clots or bleeding disorder. Respiratory ROS: positive for - shortness of breath  Cardiovascular ROS: positive for - chest pain and shortness of breath  Gastrointestinal ROS: no abdominal pain, change in bowel habits, or black or bloody stools  Genito-Urinary ROS: no dysuria, trouble voiding, or hematuria  Musculoskeletal ROS: negative  Neurological ROS: no TIA or stroke symptoms  Dermatological ROS: negative    VITALS:  Blood pressure 110/70, pulse (!) 49, weight 180 lb (81.6 kg). Body mass index is 25.1 kg/m². Physical Examination:  General appearance - alert, well appearing, and in no distress  Mental status - alert, oriented to person, place, and time  Neck - Neck is supple, no JVD or carotid bruits. No thyromegaly or adenopathy. Chest - clear to auscultation, no wheezes, rales or rhonchi, symmetric air entry  Heart - normal rate, regular rhythm, normal S1, S2, no murmurs, rubs, clicks or gallops  Abdomen - soft, nontender, nondistended, no masses or organomegaly  Neurological - alert, oriented, normal speech, no focal findings or movement disorder noted  Extremities - peripheral pulses normal, no pedal edema, no clubbing or cyanosis  Skin - normal coloration and turgor, no rashes, no suspicious skin lesions noted      EKG: normal sinus rhythm, nonspecific ST and T waves changes    Orders Placed This Encounter   Procedures    EKG 12 Lead    ECHO Exercise Stress Test       ASSESSMENT:     Diagnosis Orders   1. PE (physical exam), routine  EKG 12 Lead   2. Precordial pain - likely MSCK in nature. Doubt CAD/ischemia. 3. Hx of tob/substance use    4. Normal thoracic aorta. Doubt cause of his symptoms even if it was dilated at 3.7cm. PLAN:         As always, aggressive risk factor modification is strongly recommended.  We should adhere to the JNC VIII guidelines for HTN management and the NCEP ATP III

## 2021-09-03 ENCOUNTER — TELEPHONE (OUTPATIENT)
Dept: SPORTS MEDICINE | Age: 26
End: 2021-09-03

## 2021-09-03 NOTE — TELEPHONE ENCOUNTER
LEFT MESSAGE FOR THE PATIENT TO PLEASE CONTACT THE OFFICE TO SCHEDULE A FOLLOW UP, PATIENT IS ALSO SCHEDULED TO HAVE HIS MRI OF HIS CHEST ON 10/3

## 2021-09-29 NOTE — TELEPHONE ENCOUNTER
SPOKE TO THE PATIENT REGARDING HIS MRI, PATIENT STATED HIS MRI GOT RESCHEDULED FOR October BUT HE IS UNABLE TO COMPLETE IT, PATIENT STATED HE DOES NOT HAVE THE MONEY THEY WANTED FOR THE MRI.

## 2024-10-30 ENCOUNTER — OFFICE VISIT (OUTPATIENT)
Dept: ORTHOPEDIC SURGERY | Facility: CLINIC | Age: 29
End: 2024-10-30
Payer: COMMERCIAL

## 2024-10-30 ENCOUNTER — HOSPITAL ENCOUNTER (OUTPATIENT)
Dept: RADIOLOGY | Facility: CLINIC | Age: 29
Discharge: HOME | End: 2024-10-30
Payer: COMMERCIAL

## 2024-10-30 DIAGNOSIS — M25.511 RIGHT SHOULDER PAIN, UNSPECIFIED CHRONICITY: ICD-10-CM

## 2024-10-30 DIAGNOSIS — M95.8 WINGING OF SCAPULA: ICD-10-CM

## 2024-10-30 DIAGNOSIS — S16.1XXA CERVICAL STRAIN, INITIAL ENCOUNTER: ICD-10-CM

## 2024-10-30 PROCEDURE — 99204 OFFICE O/P NEW MOD 45 MIN: CPT | Performed by: ORTHOPAEDIC SURGERY

## 2024-10-30 PROCEDURE — 99213 OFFICE O/P EST LOW 20 MIN: CPT | Performed by: ORTHOPAEDIC SURGERY

## 2024-10-30 PROCEDURE — 73030 X-RAY EXAM OF SHOULDER: CPT | Mod: RIGHT SIDE | Performed by: ORTHOPAEDIC SURGERY

## 2024-10-30 PROCEDURE — 73030 X-RAY EXAM OF SHOULDER: CPT | Mod: RT

## 2024-10-30 RX ORDER — PREDNISONE 50 MG/1
50 TABLET ORAL DAILY
Qty: 5 TABLET | Refills: 0 | Status: SHIPPED | OUTPATIENT
Start: 2024-10-30 | End: 2024-11-04

## 2024-10-30 RX ORDER — PREDNISONE 10 MG/1
TABLET ORAL
Qty: 30 TABLET | Refills: 0 | Status: CANCELLED | OUTPATIENT
Start: 2024-10-30

## 2024-10-30 RX ORDER — METHYLPREDNISOLONE 4 MG/1
TABLET ORAL
Qty: 21 TABLET | Refills: 0 | Status: CANCELLED | OUTPATIENT
Start: 2024-10-30

## 2025-03-26 ENCOUNTER — APPOINTMENT (OUTPATIENT)
Dept: CT IMAGING | Age: 30
End: 2025-03-26

## 2025-03-26 ENCOUNTER — HOSPITAL ENCOUNTER (EMERGENCY)
Age: 30
Discharge: HOME | End: 2025-03-26
Attending: EMERGENCY MEDICINE

## 2025-03-26 VITALS
DIASTOLIC BLOOD PRESSURE: 52 MMHG | OXYGEN SATURATION: 99 % | SYSTOLIC BLOOD PRESSURE: 110 MMHG | TEMPERATURE: 97.6 F | HEIGHT: 71 IN | BODY MASS INDEX: 23.8 KG/M2 | HEART RATE: 62 BPM | RESPIRATION RATE: 18 BRPM | WEIGHT: 170 LBS

## 2025-03-26 DIAGNOSIS — E86.0 DEHYDRATION: ICD-10-CM

## 2025-03-26 DIAGNOSIS — K52.9 GASTROENTERITIS: ICD-10-CM

## 2025-03-26 DIAGNOSIS — R11.2 NAUSEA VOMITING AND DIARRHEA: Primary | ICD-10-CM

## 2025-03-26 DIAGNOSIS — R19.7 NAUSEA VOMITING AND DIARRHEA: Primary | ICD-10-CM

## 2025-03-26 LAB
ALBUMIN SERPL-MCNC: 4.7 G/DL (ref 3.5–4.6)
ALP SERPL-CCNC: 108 U/L (ref 35–104)
ALT SERPL-CCNC: 34 U/L (ref 0–41)
AMYLASE SERPL-CCNC: 59 U/L (ref 22–93)
ANION GAP SERPL CALCULATED.3IONS-SCNC: 10 MEQ/L (ref 9–15)
AST SERPL-CCNC: 27 U/L (ref 0–40)
BACTERIA URNS QL MICRO: ABNORMAL /HPF
BASOPHILS # BLD: 0 K/UL (ref 0–0.1)
BASOPHILS NFR BLD: 0.2 % (ref 0.2–1.2)
BILIRUB SERPL-MCNC: 1.4 MG/DL (ref 0.2–0.7)
BILIRUB UR QL STRIP: ABNORMAL
BUN SERPL-MCNC: 18 MG/DL (ref 6–20)
CALCIUM SERPL-MCNC: 9.7 MG/DL (ref 8.5–9.9)
CHLORIDE SERPL-SCNC: 103 MEQ/L (ref 95–107)
CLARITY UR: CLEAR
CO2 SERPL-SCNC: 26 MEQ/L (ref 20–31)
COLOR UR: ABNORMAL
CREAT SERPL-MCNC: 0.9 MG/DL (ref 0.7–1.2)
EOSINOPHIL # BLD: 0.1 K/UL (ref 0–0.5)
EOSINOPHIL NFR BLD: 0.6 % (ref 0.8–7)
EPI CELLS #/AREA URNS HPF: ABNORMAL /HPF
ERYTHROCYTE [DISTWIDTH] IN BLOOD BY AUTOMATED COUNT: 11.9 % (ref 11.6–14.4)
GLOBULIN SER CALC-MCNC: 2.6 G/DL (ref 2.3–3.5)
GLUCOSE SERPL-MCNC: 111 MG/DL (ref 70–99)
GLUCOSE UR STRIP-MCNC: NEGATIVE MG/DL
HCT VFR BLD AUTO: 48.1 % (ref 42–52)
HGB BLD-MCNC: 16.9 G/DL (ref 13.7–17.5)
HGB UR QL STRIP: ABNORMAL
IMM GRANULOCYTES # BLD: 0 K/UL
IMM GRANULOCYTES NFR BLD: 0.3 %
INFLUENZA A BY PCR: NEGATIVE
INFLUENZA B BY PCR: NEGATIVE
KETONES UR STRIP-MCNC: 15 MG/DL
LACTATE BLDV-SCNC: 1.3 MMOL/L (ref 0.5–2.2)
LEUKOCYTE ESTERASE UR QL STRIP: NEGATIVE
LIPASE SERPL-CCNC: 25 U/L (ref 12–95)
LYMPHOCYTES # BLD: 0.6 K/UL (ref 1.3–3.6)
LYMPHOCYTES NFR BLD: 5.4 %
MCH RBC QN AUTO: 30.7 PG (ref 25.7–32.2)
MCHC RBC AUTO-ENTMCNC: 35.1 % (ref 32.3–36.5)
MCV RBC AUTO: 87.5 FL (ref 79–92.2)
MONOCYTES # BLD: 0.4 K/UL (ref 0.3–0.8)
MONOCYTES NFR BLD: 3.2 % (ref 5.3–12.2)
MUCOUS THREADS URNS QL MICRO: PRESENT /LPF
NEUTROPHILS # BLD: 10.1 K/UL (ref 1.8–5.4)
NEUTS SEG NFR BLD: 90.3 % (ref 34–67.9)
NITRITE UR QL STRIP: NEGATIVE
PH UR STRIP: 5.5 [PH] (ref 5–9)
PLATELET # BLD AUTO: 212 K/UL (ref 163–337)
POTASSIUM SERPL-SCNC: 4.1 MEQ/L (ref 3.4–4.9)
PROT SERPL-MCNC: 7.3 G/DL (ref 6.3–8)
PROT UR STRIP-MCNC: 30 MG/DL
RBC # BLD AUTO: 5.5 M/UL (ref 4.63–6.08)
RBC #/AREA URNS HPF: ABNORMAL /HPF (ref 0–2)
SARS-COV-2 RDRP RESP QL NAA+PROBE: NOT DETECTED
SODIUM SERPL-SCNC: 139 MEQ/L (ref 135–144)
SP GR UR STRIP: 1.02 (ref 1–1.03)
URINE REFLEX TO CULTURE: ABNORMAL
UROBILINOGEN UR STRIP-ACNC: 0.2 E.U./DL
WBC # BLD AUTO: 11.2 K/UL (ref 4.2–9)
WBC #/AREA URNS HPF: ABNORMAL /HPF (ref 0–5)

## 2025-03-26 PROCEDURE — 96375 TX/PRO/DX INJ NEW DRUG ADDON: CPT

## 2025-03-26 PROCEDURE — 96361 HYDRATE IV INFUSION ADD-ON: CPT

## 2025-03-26 PROCEDURE — 85025 COMPLETE CBC W/AUTO DIFF WBC: CPT

## 2025-03-26 PROCEDURE — 87502 INFLUENZA DNA AMP PROBE: CPT

## 2025-03-26 PROCEDURE — 83690 ASSAY OF LIPASE: CPT

## 2025-03-26 PROCEDURE — 6370000000 HC RX 637 (ALT 250 FOR IP): Performed by: EMERGENCY MEDICINE

## 2025-03-26 PROCEDURE — 36415 COLL VENOUS BLD VENIPUNCTURE: CPT

## 2025-03-26 PROCEDURE — 87635 SARS-COV-2 COVID-19 AMP PRB: CPT

## 2025-03-26 PROCEDURE — 74177 CT ABD & PELVIS W/CONTRAST: CPT

## 2025-03-26 PROCEDURE — 83605 ASSAY OF LACTIC ACID: CPT

## 2025-03-26 PROCEDURE — 80053 COMPREHEN METABOLIC PANEL: CPT

## 2025-03-26 PROCEDURE — 2580000003 HC RX 258: Performed by: EMERGENCY MEDICINE

## 2025-03-26 PROCEDURE — 82150 ASSAY OF AMYLASE: CPT

## 2025-03-26 PROCEDURE — 6360000002 HC RX W HCPCS: Performed by: EMERGENCY MEDICINE

## 2025-03-26 PROCEDURE — 96374 THER/PROPH/DIAG INJ IV PUSH: CPT

## 2025-03-26 PROCEDURE — 6360000004 HC RX CONTRAST MEDICATION: Performed by: EMERGENCY MEDICINE

## 2025-03-26 PROCEDURE — 81001 URINALYSIS AUTO W/SCOPE: CPT

## 2025-03-26 PROCEDURE — 99285 EMERGENCY DEPT VISIT HI MDM: CPT

## 2025-03-26 RX ORDER — DICYCLOMINE HCL 20 MG
TABLET ORAL
Qty: 20 TABLET | Refills: 0 | Status: SHIPPED | OUTPATIENT
Start: 2025-03-26

## 2025-03-26 RX ORDER — 0.9 % SODIUM CHLORIDE 0.9 %
1000 INTRAVENOUS SOLUTION INTRAVENOUS ONCE
Status: DISCONTINUED | OUTPATIENT
Start: 2025-03-26 | End: 2025-03-26 | Stop reason: HOSPADM

## 2025-03-26 RX ORDER — MORPHINE SULFATE 4 MG/ML
4 INJECTION INTRAVENOUS ONCE
Refills: 0 | Status: COMPLETED | OUTPATIENT
Start: 2025-03-26 | End: 2025-03-26

## 2025-03-26 RX ORDER — IOPAMIDOL 755 MG/ML
75 INJECTION, SOLUTION INTRAVASCULAR
Status: COMPLETED | OUTPATIENT
Start: 2025-03-26 | End: 2025-03-26

## 2025-03-26 RX ORDER — ONDANSETRON 2 MG/ML
4 INJECTION INTRAMUSCULAR; INTRAVENOUS ONCE
Status: COMPLETED | OUTPATIENT
Start: 2025-03-26 | End: 2025-03-26

## 2025-03-26 RX ORDER — DICYCLOMINE HYDROCHLORIDE 10 MG/1
20 CAPSULE ORAL ONCE
Status: COMPLETED | OUTPATIENT
Start: 2025-03-26 | End: 2025-03-26

## 2025-03-26 RX ORDER — DIPHENHYDRAMINE HYDROCHLORIDE 50 MG/ML
50 INJECTION, SOLUTION INTRAMUSCULAR; INTRAVENOUS ONCE
Status: COMPLETED | OUTPATIENT
Start: 2025-03-26 | End: 2025-03-26

## 2025-03-26 RX ORDER — ONDANSETRON 4 MG/1
4 TABLET, ORALLY DISINTEGRATING ORAL 3 TIMES DAILY PRN
Qty: 21 TABLET | Refills: 0 | Status: SHIPPED | OUTPATIENT
Start: 2025-03-26

## 2025-03-26 RX ORDER — METOCLOPRAMIDE HYDROCHLORIDE 5 MG/ML
10 INJECTION INTRAMUSCULAR; INTRAVENOUS ONCE
Status: COMPLETED | OUTPATIENT
Start: 2025-03-26 | End: 2025-03-26

## 2025-03-26 RX ORDER — DIPHENOXYLATE HYDROCHLORIDE AND ATROPINE SULFATE 2.5; .025 MG/1; MG/1
1 TABLET ORAL ONCE
Status: COMPLETED | OUTPATIENT
Start: 2025-03-26 | End: 2025-03-26

## 2025-03-26 RX ORDER — 0.9 % SODIUM CHLORIDE 0.9 %
1000 INTRAVENOUS SOLUTION INTRAVENOUS ONCE
Status: COMPLETED | OUTPATIENT
Start: 2025-03-26 | End: 2025-03-26

## 2025-03-26 RX ADMIN — IOPAMIDOL 75 ML: 755 INJECTION, SOLUTION INTRAVENOUS at 09:28

## 2025-03-26 RX ADMIN — ONDANSETRON 4 MG: 2 INJECTION, SOLUTION INTRAMUSCULAR; INTRAVENOUS at 08:58

## 2025-03-26 RX ADMIN — SODIUM CHLORIDE 1000 ML: 0.9 INJECTION, SOLUTION INTRAVENOUS at 08:57

## 2025-03-26 RX ADMIN — METOCLOPRAMIDE HYDROCHLORIDE 10 MG: 5 INJECTION INTRAMUSCULAR; INTRAVENOUS at 10:09

## 2025-03-26 RX ADMIN — DICYCLOMINE HYDROCHLORIDE 20 MG: 10 CAPSULE ORAL at 10:09

## 2025-03-26 RX ADMIN — DIPHENOXYLATE HYDROCHLORIDE AND ATROPINE SULFATE 1 TABLET: 2.5; .025 TABLET ORAL at 10:09

## 2025-03-26 RX ADMIN — DIPHENHYDRAMINE HYDROCHLORIDE 50 MG: 50 INJECTION INTRAMUSCULAR; INTRAVENOUS at 10:09

## 2025-03-26 RX ADMIN — MORPHINE SULFATE 4 MG: 4 INJECTION INTRAVENOUS at 08:58

## 2025-03-26 ASSESSMENT — ENCOUNTER SYMPTOMS
EYE PAIN: 0
CHEST TIGHTNESS: 0
COUGH: 0
CONSTIPATION: 0
WHEEZING: 0
EYE REDNESS: 0
SORE THROAT: 0
FACIAL SWELLING: 0
CHOKING: 0
NAUSEA: 1
EYE DISCHARGE: 0
DIARRHEA: 1
STRIDOR: 0
SINUS PRESSURE: 0
VOMITING: 1
BACK PAIN: 0
TROUBLE SWALLOWING: 0
VOICE CHANGE: 0
SHORTNESS OF BREATH: 0
ABDOMINAL PAIN: 1
BLOOD IN STOOL: 0

## 2025-03-26 ASSESSMENT — PAIN DESCRIPTION - DESCRIPTORS
DESCRIPTORS: ACHING
DESCRIPTORS: SORE

## 2025-03-26 ASSESSMENT — PAIN SCALES - GENERAL
PAINLEVEL_OUTOF10: 1
PAINLEVEL_OUTOF10: 2

## 2025-03-26 ASSESSMENT — PAIN - FUNCTIONAL ASSESSMENT
PAIN_FUNCTIONAL_ASSESSMENT: PREVENTS OR INTERFERES SOME ACTIVE ACTIVITIES AND ADLS
PAIN_FUNCTIONAL_ASSESSMENT: 0-10
PAIN_FUNCTIONAL_ASSESSMENT: 0-10

## 2025-03-26 ASSESSMENT — PAIN DESCRIPTION - FREQUENCY: FREQUENCY: CONTINUOUS

## 2025-03-26 ASSESSMENT — LIFESTYLE VARIABLES
HOW OFTEN DO YOU HAVE A DRINK CONTAINING ALCOHOL: MONTHLY OR LESS
HOW MANY STANDARD DRINKS CONTAINING ALCOHOL DO YOU HAVE ON A TYPICAL DAY: 1 OR 2

## 2025-03-26 ASSESSMENT — PAIN DESCRIPTION - LOCATION
LOCATION: ABDOMEN
LOCATION: ABDOMEN

## 2025-03-26 ASSESSMENT — PAIN DESCRIPTION - ORIENTATION: ORIENTATION: LEFT;MID

## 2025-03-26 ASSESSMENT — PAIN DESCRIPTION - PAIN TYPE
TYPE: ACUTE PAIN
TYPE: ACUTE PAIN

## 2025-03-26 ASSESSMENT — PAIN DESCRIPTION - ONSET: ONSET: GRADUAL

## 2025-03-26 NOTE — ED PROVIDER NOTES
[facility] EMERGENCY DEPARTMENT  eMERGENCY dEPARTMENT eNCOUnter      Pt Name: Kenny Maldonado  MRN: [id number]  Birthdate 1995  Date of evaluation: 3/26/2025  Provider: Brittani Kay MD    CHIEF COMPLAINT       Chief Complaint   Patient presents with    Abdominal Pain     Onset- 2 days.    Nausea     Decreased appetite.     Vomiting    Migraine     Onset- 5 days.     TORY OF PRESENT ILLNESS   (Location/Symptom, Timing/Onset,Context/Setting, Quality, Duration, Modifying Factors, Severity)  Note limiting factors.   Kenny Maldonado is a 29 y.o. male who presents to the emergency department patient accompanied by his girlfriend come to emergency for ongoing issues with nausea vomiting diarrhea flulike symptom for the last 5 to 6 days time not getting better patient tried to eat cheeseburger last night but unable because of the vomiting and diarrhea with generalized weakness abdominal cramping going on for the past few days time no previous abdominal surgery no fever no recent traveling denies any blood in the stools no previous history of colitis or ulcerative colitis denies any drinking any alcohol patient does vape denies any history of diabetes mellitus patient with history of chronic shoulder discomfort for which patient is seen orthopedic doctor in the past    HPI    NursingNotes were reviewed.    REVIEW OF SYSTEMS    (2-9 systems for level 4, 10 or more for level 5)     Review of Systems   Constitutional:  Positive for activity change, appetite change, chills, diaphoresis, fatigue and fever.   HENT:  Negative for congestion, drooling, facial swelling, mouth sores, nosebleeds, sinus pressure, sore throat, trouble swallowing and voice change.    Eyes:  Negative for pain, discharge, redness and visual disturbance.   Respiratory:  Negative for cough, choking, chest tightness, shortness of breath, wheezing and stridor.    Cardiovascular:  Negative for chest pain, palpitations and leg swelling.   Gastrointestinal:

## 2025-06-06 ENCOUNTER — HOSPITAL ENCOUNTER (EMERGENCY)
Facility: HOSPITAL | Age: 30
Discharge: HOME | End: 2025-06-06

## 2025-06-06 VITALS
WEIGHT: 170 LBS | TEMPERATURE: 98.1 F | OXYGEN SATURATION: 97 % | HEIGHT: 71 IN | DIASTOLIC BLOOD PRESSURE: 73 MMHG | RESPIRATION RATE: 16 BRPM | BODY MASS INDEX: 23.8 KG/M2 | SYSTOLIC BLOOD PRESSURE: 123 MMHG | HEART RATE: 71 BPM

## 2025-06-06 DIAGNOSIS — R09.81 NASAL CONGESTION: ICD-10-CM

## 2025-06-06 DIAGNOSIS — J30.9 ALLERGIC RHINITIS, UNSPECIFIED SEASONALITY, UNSPECIFIED TRIGGER: Primary | ICD-10-CM

## 2025-06-06 LAB
FLUAV RNA RESP QL NAA+PROBE: NOT DETECTED
FLUBV RNA RESP QL NAA+PROBE: NOT DETECTED
RSV RNA RESP QL NAA+PROBE: NOT DETECTED
SARS-COV-2 RNA RESP QL NAA+PROBE: NOT DETECTED

## 2025-06-06 PROCEDURE — 99283 EMERGENCY DEPT VISIT LOW MDM: CPT

## 2025-06-06 PROCEDURE — 2500000001 HC RX 250 WO HCPCS SELF ADMINISTERED DRUGS (ALT 637 FOR MEDICARE OP)

## 2025-06-06 PROCEDURE — 87637 SARSCOV2&INF A&B&RSV AMP PRB: CPT

## 2025-06-06 RX ORDER — CETIRIZINE HYDROCHLORIDE 10 MG/1
10 TABLET ORAL ONCE
Status: COMPLETED | OUTPATIENT
Start: 2025-06-06 | End: 2025-06-06

## 2025-06-06 RX ORDER — FLUTICASONE PROPIONATE 50 MCG
2 SPRAY, SUSPENSION (ML) NASAL DAILY
Qty: 16 G | Refills: 0 | Status: SHIPPED | OUTPATIENT
Start: 2025-06-06 | End: 2025-06-13

## 2025-06-06 RX ORDER — OXYMETAZOLINE HCL 0.05 %
2 SPRAY, NON-AEROSOL (ML) NASAL EVERY 12 HOURS PRN
Qty: 30 ML | Refills: 0 | Status: SHIPPED | OUTPATIENT
Start: 2025-06-06 | End: 2025-06-09

## 2025-06-06 RX ADMIN — CETIRIZINE HYDROCHLORIDE 10 MG: 10 TABLET ORAL at 12:56

## 2025-06-06 ASSESSMENT — LIFESTYLE VARIABLES
HAVE YOU EVER FELT YOU SHOULD CUT DOWN ON YOUR DRINKING: NO
EVER FELT BAD OR GUILTY ABOUT YOUR DRINKING: NO
TOTAL SCORE: 0
HAVE PEOPLE ANNOYED YOU BY CRITICIZING YOUR DRINKING: NO
EVER HAD A DRINK FIRST THING IN THE MORNING TO STEADY YOUR NERVES TO GET RID OF A HANGOVER: NO

## 2025-06-06 ASSESSMENT — PAIN - FUNCTIONAL ASSESSMENT: PAIN_FUNCTIONAL_ASSESSMENT: 0-10

## 2025-06-06 ASSESSMENT — COLUMBIA-SUICIDE SEVERITY RATING SCALE - C-SSRS
2. HAVE YOU ACTUALLY HAD ANY THOUGHTS OF KILLING YOURSELF?: NO
6. HAVE YOU EVER DONE ANYTHING, STARTED TO DO ANYTHING, OR PREPARED TO DO ANYTHING TO END YOUR LIFE?: NO
1. IN THE PAST MONTH, HAVE YOU WISHED YOU WERE DEAD OR WISHED YOU COULD GO TO SLEEP AND NOT WAKE UP?: NO

## 2025-06-06 ASSESSMENT — PAIN SCALES - GENERAL: PAINLEVEL_OUTOF10: 0 - NO PAIN

## 2025-06-07 ASSESSMENT — VISUAL ACUITY: OU: 1

## 2025-06-07 NOTE — ED PROVIDER NOTES
HPI   Chief Complaint   Patient presents with    Nasal Congestion     States his nose is stuffed up for 2 months he thinks maybe a sinus infection from his job       29-year-old male presents to the emergency department for evaluation of congestion for the past 2-2.5 months.  Patient reports that at the onset of of his congestion symptoms he had just started a new job as a , stating there are always lots of fumes in the air.  He states his congestion is not just one-sided, switches to either side depending on how he is laying or what he is doing.  It can also be on both sides.  Reports that he is constantly blowing his nose also is experiencing itchy eyes.  He is unsure if he has had seasonal allergies in the past, but he cites a new job denies any new soaps, foods, or exposures.  He denies cough, epistaxis, sore throat, CP, SOB, ear pain or ringing in his ears, lightheadedness, dizziness, LOC, seizure-like activity, blurred or double vision, headache, drooling or trouble swallowing, nausea, vomiting, diarrhea, constipation, fever, night sweats, recent weight loss, chills, body aches.  Denies any pain.  Denies any other concerns or symptoms today.  Patient denies trying any over-the-counter medication prior to presenting to the emergency department.      History provided by:  Patient   used: No      Patient History   Medical History[1]  Surgical History[2]  Family History[3]  Social History[4]    Physical Exam   ED Triage Vitals [06/06/25 1212]   Temperature Heart Rate Respirations BP   36.7 °C (98.1 °F) 71 16 123/73      Pulse Ox Temp src Heart Rate Source Patient Position   97 % -- -- --      BP Location FiO2 (%)     -- --       Physical Exam  Vitals and nursing note reviewed.   Constitutional:       General: He is not in acute distress.     Appearance: Normal appearance. He is normal weight. He is not ill-appearing, toxic-appearing or diaphoretic.   HENT:      Head:  Normocephalic and atraumatic. No right periorbital erythema or left periorbital erythema.      Jaw: There is normal jaw occlusion.      Salivary Glands: Right salivary gland is not diffusely enlarged or tender. Left salivary gland is not diffusely enlarged or tender.      Right Ear: Hearing, tympanic membrane, ear canal and external ear normal. There is no impacted cerumen. No mastoid tenderness. No hemotympanum. Tympanic membrane is not erythematous or bulging.      Left Ear: Hearing, tympanic membrane and ear canal normal. There is no impacted cerumen. No mastoid tenderness. No hemotympanum. Tympanic membrane is not erythematous or bulging.      Nose: Congestion present. No nasal tenderness or rhinorrhea.      Right Nostril: No foreign body, septal hematoma or occlusion.      Left Nostril: No foreign body, septal hematoma or occlusion.      Right Turbinates: Not enlarged, swollen or pale.      Left Turbinates: Not enlarged, swollen or pale.      Right Sinus: No maxillary sinus tenderness or frontal sinus tenderness.      Left Sinus: No maxillary sinus tenderness or frontal sinus tenderness.      Mouth/Throat:      Lips: Pink. No lesions.      Mouth: Mucous membranes are moist. No oral lesions or angioedema.      Dentition: Normal dentition.      Tongue: No lesions. Tongue does not deviate from midline.      Pharynx: Oropharynx is clear. Uvula midline. No pharyngeal swelling, oropharyngeal exudate, posterior oropharyngeal erythema, uvula swelling or postnasal drip.      Tonsils: No tonsillar exudate or tonsillar abscesses.   Eyes:      General: Lids are normal. Vision grossly intact. Gaze aligned appropriately. No allergic shiner, visual field deficit or scleral icterus.        Right eye: No discharge.         Left eye: No discharge.      Extraocular Movements: Extraocular movements intact.      Conjunctiva/sclera: Conjunctivae normal.      Pupils: Pupils are equal, round, and reactive to light.      Visual Fields:  Right eye visual fields normal and left eye visual fields normal.   Neck:      Thyroid: No thyroid mass.      Vascular: No carotid bruit.      Trachea: Trachea and phonation normal.   Cardiovascular:      Rate and Rhythm: Normal rate and regular rhythm.      Pulses: Normal pulses.      Heart sounds: Normal heart sounds. No murmur heard.     No friction rub. No gallop.   Pulmonary:      Effort: Pulmonary effort is normal. No respiratory distress.      Breath sounds: Normal breath sounds. No stridor. No wheezing, rhonchi or rales.   Chest:      Chest wall: No tenderness.   Abdominal:      General: Abdomen is flat. There is no distension.      Palpations: Abdomen is soft.      Tenderness: There is no abdominal tenderness. There is no right CVA tenderness, left CVA tenderness, guarding or rebound.   Musculoskeletal:         General: No swelling, tenderness or deformity. Normal range of motion.      Cervical back: Normal range of motion and neck supple. No erythema, rigidity or tenderness. No spinous process tenderness or muscular tenderness.      Right lower leg: No edema.      Left lower leg: No edema.   Lymphadenopathy:      Cervical: No cervical adenopathy.   Skin:     General: Skin is warm.      Capillary Refill: Capillary refill takes less than 2 seconds.      Coloration: Skin is not jaundiced or pale.      Findings: No erythema, lesion or rash.   Neurological:      General: No focal deficit present.      Mental Status: He is alert and oriented to person, place, and time.      Sensory: No sensory deficit.      Motor: No weakness.       ED Course & MDM   Diagnoses as of 06/07/25 0927   Allergic rhinitis, unspecified seasonality, unspecified trigger   Nasal congestion     Mckenzie Coma Scale Score: 15 (06/06/25 1214 : Aliyah Sin RN)                   Labs Reviewed   SARS-COV-2, INFLUENZA A/B AND RSV PCR - Normal       Result Value    Coronavirus 2019, PCR Not Detected      Flu A Result Not Detected      Flu B  Result Not Detected      RSV PCR Not Detected      Narrative:     This assay is an FDA-cleared, in vitro diagnostic nucleic acid amplification test for the qualitative detection and differentiation of SARS CoV-2/ Influenza A/B/ RSV from nasopharyngeal specimens collected from individuals with signs and symptoms of respiratory tract infections, and has been validated for use at [facility]. Negative results do not preclude COVID-19/ Influenza A/B/ RSV infections and should not be used as the sole basis for diagnosis, treatment, or other management decisions. Testing for SARS CoV-2 is recommended only for patients who meet current clinical and/or epidemiological criteria defined by federal, state, or local public health directives.      No orders to display       Medical Decision Making  Differential diagnoses considered but not limited to: Sinusitis, seasonal allergies, allergic rhinitis, viral syndrome    29-year-old male presents to the emergency department for evaluation of congestion for the past 2-2.5 months.  On presentation /73, afebrile, HR 71, RR 16, SpO2 97%.  On exam patient is nontoxic-appearing, no acute distress.  Heart sounds normal with regular rate and rhythm.  Lungs clear to auscultation bilaterally with no adventitious sounds.  TMs normal bilaterally.  No mastoid tenderness.  Posterior oropharynx is clear, nonerythematous, no tonsillar swelling or exudate.  Patient does exhibit congestion on exam appears to be bilateral at this time however nares are patent.  He is neurovascularly intact.  Given the duration of the patient's symptoms with no systemic symptoms or other symptoms that would indicate viral or bacterial cause I do suspect patient is experiencing allergic rhinitis associated with his new job.  Patient PCR's were negative for COVID-19, influenza, RSV.  I did advise that patient obtain a respirator that he could use at his work to see if his congestion is  reduced.  Patient was also given a referral to an allergist.  Offered patient's referral to primary care provider, however reports that he would not present as he is not on insurance at the moment.  Also gave patient a prescription for Zyrtec, Flonase and Afrin.  Extensively educated the patient on use of Afrin advising that this only be used for 3 days at the most.  I did also discuss the possibility of changing jobs with the patient, he reports he is already looking for a new job.  Patient was given very strict return precautions with any new or worsening symptoms.  Advised to follow-up with the allergy testing referral.    As a result of the work-up, the patient was discharged home.  he was informed of his diagnosis, educated on lab and imaging findings, I explained reasons for the patient to return to the Emergency Department and instructed to come back with any concerns or worsening of condition.  he demonstrated verbal understanding and were in agreement with the plan of care.  I emphasized the importance of follow up with the physician I referred them to in the timeframe recommended.  he was given the opportunity to ask questions.  All of the patient's questions were answered.  Patient discharged in good stable condition.               [1] History reviewed. No pertinent past medical history.  [2] History reviewed. No pertinent surgical history.  [3] No family history on file.  [4]   Social History  Tobacco Use    Smoking status: Not on file    Smokeless tobacco: Not on file   Substance Use Topics    Alcohol use: Not on file    Drug use: Not on file        Brendan Koenig PA-C  06/07/25 0911

## 2025-06-09 NOTE — PROGRESS NOTES
[facility]) Physicians  Neurosurgery and Pain [facility]  [address], [address] [facility] , [address] [facility], [place]: [phone]  F: [phone]      Jayson Johnson  (1995)    2021    Subjective:     Jayson Johnson is 30 y.o. male who complains today of:    Chief Complaint   Patient presents with    Shoulder Pain    Chest Pain       HPI     Patient comes in with a chief complaint of many years of sternal and left-sided chest pain states that he is been through full cardiac work-ups as well as other medical work-ups and they found no issues and there is here today for further evaluate his sternum and sternoclavicular region says the pain is the same or getting worse and sometimes it brings him to his knees does not recall ever injuring the area  Allergies:  Patient has no known allergies. No past medical history on file.   Past Surgical History:   Procedure Laterality Date    ANKLE SURGERY      left    KNEE CARTILAGE SURGERY Right 2018     Family History   Problem Relation Age of Onset    Atrial Fibrillation Maternal Grandmother      Social History     Socioeconomic History    Marital status: Single     Spouse name: Not on file    Number of children: Not on file    Years of education: Not on file    Highest education level: Not on file   Occupational History    Not on file   Tobacco Use    Smoking status: Former Smoker     Packs/day: 1.00     Years: 10.00     Pack years: 10.00     Types: Cigarettes     Quit date: 6/10/2020     Years since quittin.1    Smokeless tobacco: Never Used   Vaping Use    Vaping Use: Some days    Substances: Nicotine   Substance and Sexual Activity    Alcohol use: No    Drug use: Yes     Types: Marijuana    Sexual activity: Yes     Partners: Female   Other Topics Concern    Not on file   Social History Narrative    Not on file     Social Determinants of Health     Financial Resource Strain: Low Risk     Difficulty of Paying Living Expenses: Not hard at all   Food Insecurity: No Food Insecurity    Worried About [address] [facility] in the Last Year: Never true    Young of Food in the Last Year: Never true   Transportation Needs: No Transportation Needs    Lack of Transportation (Medical): No    Lack of Transportation (Non-Medical): No   Physical Activity:     Days of Exercise per Week:     Minutes of Exercise per Session:    Stress:     Feeling of Stress :    Social Connections:     Frequency of Communication with Friends and Family:     Frequency of Social Gatherings with Friends and Family:     Attends Christian Services:     Active Member of Clubs or Organizations:     Attends Club or Organization Meetings:     Marital Status:    Intimate Partner Violence:     Fear of Current or Ex-Partner:     Emotionally Abused:     Physically Abused:     Sexually Abused:        Current Outpatient Medications on File Prior to Visit   Medication Sig Dispense Refill    tiZANidine (ZANAFLEX) 2 MG tablet Take 1 tablet by mouth every 8 hours as needed (shoulder pain) Hold for sedation, no driving, operating machinery. 30 tablet 0    predniSONE (DELTASONE) 10 MG tablet Take 4 tabs po qd x 2 days, then 3 tabs po qd x 2 days, then 2 tabs po qd x 2 days, then 1 tab po qd x 2 days, then 1/2 tab po qd x 2 days. 21 tablet 0    diclofenac (VOLTAREN) 50 MG EC tablet Take 1 tablet by mouth 3 times daily as needed for Pain 60 tablet 3    lidocaine 4 % external patch Place 1 patch onto the skin daily Apply to area of chest pain 1 box 3    diclofenac sodium (VOLTAREN) 1 % GEL Apply topically 2 times daily Apply to area of chest 50 g 2    ibuprofen (ADVIL;MOTRIN) 800 MG tablet Take 1 tablet by mouth 2 times daily as needed for Pain 60 tablet 0    ketorolac (TORADOL) 10 MG tablet Take 1 tablet by mouth every 6 hours as needed for Pain 20 tablet 0     No current facility-administered medications on file prior to visit.          Review of Systems Constitutional: Negative for activity change and fever. HENT: Negative for congestion, facial swelling and sinus pain. Eyes: Negative for discharge. Respiratory: Negative for apnea and chest tightness. Gastrointestinal: Negative for abdominal distention. Endocrine: Negative for cold intolerance. Genitourinary: Negative for difficulty urinating and dysuria. Allergic/Immunologic: Negative for immunocompromised state. Neurological: Negative for dizziness. Hematological: Negative for adenopathy. Psychiatric/Behavioral: Negative for agitation, behavioral problems and confusion. Objective:     Vitals:  /66 (Site: Left Upper Arm)   Temp 97.3 °F (36.3 °C) (Temporal)   Ht 5' 11\" (1.803 m)   Wt 175 lb (79.4 kg)   BMI 24.41 kg/m²        Physical Exam  Constitutional:       Appearance: Normal appearance. HENT:      Head: Normocephalic. Eyes:      Pupils: Pupils are equal, round, and reactive to light. Cardiovascular:      Rate and Rhythm: Normal rate. Pulses: Normal pulses. Pulmonary:      Breath sounds: No wheezing. Abdominal:      Palpations: Abdomen is soft. Musculoskeletal:      Cervical back: Neck supple. Skin:     General: Skin is warm and dry. Neurological:      Mental Status: He is alert and oriented to person, place, and time. Psychiatric:         Mood and Affect: Mood normal.         Behavior: Behavior normal.         Ortho Exam   Examination of the chest and shoulders reveal rotator cuff to be intact near full range of motion of the shoulder joint the neurovascular muscular status was intact tenderness and fullness was noted over the left sternoclavicular joint tenderness extended down into the sternum and to the angle of Terrell there was pain with compression of the ribs just to get in this aforementioned area pain with rotation in the area    Assessment:      Diagnosis Orders   1.  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 Tear of articular disc of sternoclavicular joint  MRI CHEST WO CONTRAST       Plan:   Patient that he was sent for evaluation and treatment he is gone through extensive diagnostics without any relevant findings we are getting x-rays of the [facility] joint as well as starting today and also following up with an MRI of the chest to look for pathology within the inflamed and edematous area see him back with MRI results       No orders of the defined types were placed in this encounter. Orders Placed This Encounter   Procedures    XR STERNOCLAVICULAR JOINTS (MIN 3 VIEWS)     Standing Status:   Future     Standing Expiration Date:   8/9/2022    XR STERNUM (MIN 2 VIEWS)     ATTENTION TO STERNOCLAVICULAR JOINTS AND STERNUM     Standing Status:   Future     Standing Expiration Date:   8/9/2022    MRI CHEST WO CONTRAST     Standing Status:   Future     Standing Expiration Date:   8/9/2022         Follow up:  Return for mri results.     ROGE COPELAND, DO Detail Level: Zone Send Procedure Quote As Charge: No

## 2025-07-30 ENCOUNTER — HOSPITAL ENCOUNTER (EMERGENCY)
Facility: HOSPITAL | Age: 30
Discharge: LEFT BEFORE DISCHARGE | End: 2025-07-30

## 2025-07-30 VITALS
SYSTOLIC BLOOD PRESSURE: 128 MMHG | WEIGHT: 170 LBS | HEIGHT: 71 IN | RESPIRATION RATE: 20 BRPM | BODY MASS INDEX: 23.8 KG/M2 | OXYGEN SATURATION: 95 % | TEMPERATURE: 98.8 F | HEART RATE: 91 BPM | DIASTOLIC BLOOD PRESSURE: 68 MMHG

## 2025-07-30 PROCEDURE — 99281 EMR DPT VST MAYX REQ PHY/QHP: CPT

## 2025-07-30 PROCEDURE — 4500999001 HC ED NO CHARGE

## 2025-07-30 ASSESSMENT — PAIN DESCRIPTION - DESCRIPTORS: DESCRIPTORS: ACHING

## 2025-07-30 ASSESSMENT — PAIN SCALES - GENERAL: PAINLEVEL_OUTOF10: 3

## 2025-07-30 ASSESSMENT — PAIN DESCRIPTION - ONSET: ONSET: SUDDEN

## 2025-07-30 ASSESSMENT — PAIN DESCRIPTION - LOCATION: LOCATION: SHOULDER

## 2025-07-30 ASSESSMENT — PAIN DESCRIPTION - ORIENTATION: ORIENTATION: RIGHT

## 2025-07-30 ASSESSMENT — PAIN - FUNCTIONAL ASSESSMENT: PAIN_FUNCTIONAL_ASSESSMENT: 0-10

## 2025-07-30 ASSESSMENT — PAIN DESCRIPTION - FREQUENCY: FREQUENCY: CONSTANT/CONTINUOUS

## 2025-07-30 ASSESSMENT — PAIN DESCRIPTION - PAIN TYPE: TYPE: ACUTE PAIN

## 2025-07-30 ASSESSMENT — PAIN DESCRIPTION - PROGRESSION: CLINICAL_PROGRESSION: NOT CHANGED
